# Patient Record
Sex: FEMALE | Race: WHITE | NOT HISPANIC OR LATINO | Employment: STUDENT | URBAN - NONMETROPOLITAN AREA
[De-identification: names, ages, dates, MRNs, and addresses within clinical notes are randomized per-mention and may not be internally consistent; named-entity substitution may affect disease eponyms.]

---
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
## 2024-04-02 ENCOUNTER — TELEPHONE (OUTPATIENT)
Dept: PRIMARY CARE | Facility: CLINIC | Age: 19
End: 2024-04-02

## 2024-04-04 PROBLEM — G89.29 CHRONIC HEADACHES: Status: ACTIVE | Noted: 2024-04-04

## 2024-04-04 PROBLEM — J45.909 ASTHMA (HHS-HCC): Status: ACTIVE | Noted: 2024-04-04

## 2024-04-04 PROBLEM — I51.89 FAMILIAL HEART DISEASE: Status: ACTIVE | Noted: 2024-04-04

## 2024-04-04 PROBLEM — R48.0 DYSLEXIA: Status: ACTIVE | Noted: 2024-04-04

## 2024-04-04 PROBLEM — R51.9 CHRONIC HEADACHES: Status: ACTIVE | Noted: 2024-04-04

## 2024-04-04 PROBLEM — S09.91XA TRAUMA TO EAR: Status: ACTIVE | Noted: 2024-04-04

## 2024-04-04 PROBLEM — L70.9 ACNE: Status: ACTIVE | Noted: 2024-04-04

## 2024-04-04 PROBLEM — G47.9 SLEEP DIFFICULTIES: Status: ACTIVE | Noted: 2024-04-04

## 2024-04-04 PROBLEM — F41.9 ANXIETY: Status: ACTIVE | Noted: 2024-04-04

## 2024-04-04 PROBLEM — N94.6 DYSMENORRHEA: Status: ACTIVE | Noted: 2024-04-04

## 2024-04-04 NOTE — PROGRESS NOTES
Subjective        Sheila Augustin is a 18 y.o. female who presents for      HPI:    Pt is NEW PATIENT to the office today       No chief complaint on file.      Pt has history of PTSD- on Prozac and Prazosin- who is Psychiatrist?      What brings pt to the office today?                Neuro - Dr Alma Delia Sheldon     Social History     Tobacco Use   Smoking Status Not on file   Smokeless Tobacco Not on file         Review of Systems:        Objective        There were no vitals filed for this visit.                    BP Readings from Last 3 Encounters:   03/29/23 126/76   05/13/21 124/80 (89 %, Z = 1.23 /  93 %, Z = 1.48)*   04/23/21 121/78 (83 %, Z = 0.95 /  91 %, Z = 1.34)*     *BP percentiles are based on the 2017 AAP Clinical Practice Guideline for girls           Wt Readings from Last 3 Encounters:   03/29/23 72.1 kg (159 lb) (87 %, Z= 1.14)*   05/13/21 67.5 kg (84 %, Z= 1.01)*   04/23/21 69.5 kg (87 %, Z= 1.14)*     * Growth percentiles are based on CDC (Girls, 2-20 Years) data.         BMI Readings from Last 3 Encounters:   03/29/23 24.90 kg/m² (79 %, Z= 0.80)*   05/13/21 22.95 kg/m² (70 %, Z= 0.53)*   04/23/21 23.99 kg/m² (78 %, Z= 0.77)*     * Growth percentiles are based on CDC (Girls, 2-20 Years) data.         Assessment/Plan      No diagnosis found.    No orders of the defined types were placed in this encounter.                Call if no better or if symptoms worsen

## 2024-04-09 ENCOUNTER — APPOINTMENT (OUTPATIENT)
Dept: PRIMARY CARE | Facility: CLINIC | Age: 19
End: 2024-04-09

## 2024-05-06 ENCOUNTER — APPOINTMENT (OUTPATIENT)
Dept: PRIMARY CARE | Facility: CLINIC | Age: 19
End: 2024-05-06

## 2024-11-01 ENCOUNTER — OFFICE VISIT (OUTPATIENT)
Dept: URGENT CARE | Age: 19
End: 2024-11-01
Payer: COMMERCIAL

## 2024-11-01 VITALS
HEART RATE: 67 BPM | TEMPERATURE: 98.3 F | BODY MASS INDEX: 29.82 KG/M2 | WEIGHT: 190 LBS | SYSTOLIC BLOOD PRESSURE: 112 MMHG | OXYGEN SATURATION: 98 % | HEIGHT: 67 IN | RESPIRATION RATE: 18 BRPM | DIASTOLIC BLOOD PRESSURE: 63 MMHG

## 2024-11-01 DIAGNOSIS — J01.00 ACUTE NON-RECURRENT MAXILLARY SINUSITIS: Primary | ICD-10-CM

## 2024-11-01 RX ORDER — PREDNISONE 20 MG/1
20 TABLET ORAL DAILY
Qty: 5 TABLET | Refills: 0 | Status: SHIPPED | OUTPATIENT
Start: 2024-11-01 | End: 2024-11-06

## 2024-11-01 RX ORDER — CEFDINIR 300 MG/1
300 CAPSULE ORAL 2 TIMES DAILY
Qty: 14 CAPSULE | Refills: 0 | Status: SHIPPED | OUTPATIENT
Start: 2024-11-01 | End: 2024-11-08

## 2024-11-01 ASSESSMENT — ENCOUNTER SYMPTOMS
PSYCHIATRIC NEGATIVE: 1
MUSCULOSKELETAL NEGATIVE: 1
ALLERGIC/IMMUNOLOGIC NEGATIVE: 1
ENDOCRINE NEGATIVE: 1
CARDIOVASCULAR NEGATIVE: 1
HEMATOLOGIC/LYMPHATIC NEGATIVE: 1
NEUROLOGICAL NEGATIVE: 1
CONSTITUTIONAL NEGATIVE: 1
GASTROINTESTINAL NEGATIVE: 1
RESPIRATORY NEGATIVE: 1